# Patient Record
Sex: FEMALE | Race: WHITE | NOT HISPANIC OR LATINO | Employment: PART TIME | URBAN - METROPOLITAN AREA
[De-identification: names, ages, dates, MRNs, and addresses within clinical notes are randomized per-mention and may not be internally consistent; named-entity substitution may affect disease eponyms.]

---

## 2023-03-30 ENCOUNTER — OFFICE VISIT (OUTPATIENT)
Dept: OBGYN CLINIC | Facility: CLINIC | Age: 48
End: 2023-03-30

## 2023-03-30 VITALS
DIASTOLIC BLOOD PRESSURE: 78 MMHG | HEIGHT: 67 IN | WEIGHT: 146.4 LBS | SYSTOLIC BLOOD PRESSURE: 120 MMHG | BODY MASS INDEX: 22.98 KG/M2

## 2023-03-30 DIAGNOSIS — D21.9 FIBROID: ICD-10-CM

## 2023-03-30 DIAGNOSIS — F32.A DEPRESSION, UNSPECIFIED DEPRESSION TYPE: ICD-10-CM

## 2023-03-30 DIAGNOSIS — N63.10 MASS OF RIGHT BREAST, UNSPECIFIED QUADRANT: ICD-10-CM

## 2023-03-30 DIAGNOSIS — Z01.411 ENCOUNTER FOR GYNECOLOGICAL EXAMINATION WITH ABNORMAL FINDING: Primary | ICD-10-CM

## 2023-03-30 DIAGNOSIS — Z12.31 ENCOUNTER FOR SCREENING MAMMOGRAM FOR MALIGNANT NEOPLASM OF BREAST: ICD-10-CM

## 2023-03-30 DIAGNOSIS — Z12.4 SCREENING FOR CERVICAL CANCER: ICD-10-CM

## 2023-03-30 RX ORDER — QUINIDINE GLUCONATE 324 MG
240 TABLET, EXTENDED RELEASE ORAL
COMMUNITY

## 2023-03-30 RX ORDER — NAPROXEN 500 MG/1
TABLET ORAL AS NEEDED
COMMUNITY
Start: 2023-03-03

## 2023-03-30 RX ORDER — ALBUTEROL SULFATE 90 UG/1
2 AEROSOL, METERED RESPIRATORY (INHALATION) 3 TIMES DAILY PRN
COMMUNITY
Start: 2013-03-07

## 2023-03-30 RX ORDER — MAGNESIUM CHLORIDE 64 MG
1070 TABLET, DELAYED RELEASE (ENTERIC COATED) ORAL DAILY
COMMUNITY

## 2023-03-30 RX ORDER — MAGNESIUM OXIDE/MAG AA CHELATE 300 MG
2 CAPSULE ORAL
COMMUNITY

## 2023-03-30 NOTE — PATIENT INSTRUCTIONS
Pap every 3- 5 years if normal, sexually transmitted infection testing as indicated, exercise most days of week, obtain appropriate diet and hydration, Calcium 1000mg + 600 vit D daily, birth control as directed (ACHES reviewed)  Benefits, risks and alternatives discussed/reviewed  HPV 9 vaccine recommended through age 39  Check with your insurance for coverage  If covered, call office to schedule start of vaccine series  Annual mammogram starting at age 36, monthly breast self exam  Maryuri Vaca 20 times twice daily

## 2023-03-30 NOTE — PROGRESS NOTES
[id number]   [address], [address], [facility], [facility]    ASSESSMENT/PLAN: Sameer Mariano is a 50 y o  No obstetric history on file  who presents for annual gynecologic exam     Encounter for routine gynecologic examination  - Routine well woman exam completed today  - Cervical Cancer Screening: Current ASCCP Guidelines reviewed  Last Pap: 02/15/2019   Next Pap Due: today   - STI screening offered including HIV testing: na  dw    - Contraceptive counseling discussed  Current contraception  None :   - Breast Cancer Screening: Last Mammogram 2023, needs  Biopsy      Additional problems addressed during this visit:  1  Encounter for gynecological examination with abnormal finding    2  Encounter for screening mammogram for malignant neoplasm of breast  -     Mammo screening bilateral w 3d & cad; Future    3  Depression, unspecified depression type  Comments:  after death of  father   and currently getting  a divorce     4  Screening for cervical cancer    5  Mass of right breast, unspecified quadrant  Comments:  fu       6  Fibroid        CC:  Annual Gynecologic Examination    HPI: Sameer Mariano is a 50 y o  No obstetric history on file  who presents for annual gynecologic examination  49 yo   ( 2 living  Twins female)    Cycles every 28  D  For  5 d    + hx of PCOS    + hx of fibroid   Some    Discomfort prior to  Menses    + hx of breast  Biopsy in left benign   Pending right   bx on        The following portions of the patient's history were reviewed and updated as appropriate: She  has a past medical history of Asthma (1977), Deep vein thrombosis ([facility] [address] ) (2009), Depression (2020), Female infertility (2006), Fibroid (2006), History of screening mammography (2023), Miscarriage (2006), and Varicella (1977)  She  has a past surgical history that includes Breast biopsy (1992) and  section (2009)    Her family history includes Asthma in her brother, daughter, father, and sister; Colon cancer (age of onset: 76) in her paternal grandfather; Colon polyps in her mother; Diabetes in her father; Hypertension in her father; Pancreatic cancer (age of onset: 59) in her father; Thyroid disease in her mother  She  reports that she quit smoking about 22 years ago  Her smoking use included cigarettes  She started smoking about 28 years ago  She has a 1 25 pack-year smoking history  She has never used smokeless tobacco  She reports current alcohol use of about 4 0 standard drinks per week  She reports that she does not currently use drugs after having used the following drugs: Cocaine and Marijuana  Frequency: 1 00 time per week  Current Outpatient Medications   Medication Sig Dispense Refill   • albuterol (PROVENTIL HFA,VENTOLIN HFA) 90 mcg/act inhaler Inhale 2 puffs 3 (three) times a day as needed     • Cholecalciferol (Vitamin D3) 125 MCG (5000 UT) CAPS Take by mouth     • ferrous gluconate (Iron 27) 240 (27 FE) MG tablet Take 240 mg by mouth Daily at 2am     • Magnesium 300 MG CAPS Take 2 capsules by mouth Daily at 2am     • naproxen (NAPROSYN) 500 mg tablet if needed     • magnesium chloride (MAG64) 64 MG TBEC EC tablet Take 1,070 mg by mouth daily (Patient not taking: Reported on 3/30/2023)       No current facility-administered medications for this visit  She is allergic to epinephrine       Review of Systems   Constitutional: Negative for chills and fever  HENT: Negative for ear pain and sore throat  Eyes: Negative for pain and visual disturbance  Respiratory: Negative for cough and shortness of breath  Cardiovascular: Negative for chest pain and palpitations  Gastrointestinal: Negative for abdominal pain and vomiting  Genitourinary: Negative for dysuria and hematuria  Musculoskeletal: Negative for arthralgias and back pain  Skin: Negative for color change and rash     Neurological: Negative for "seizures and syncope  Hematological: Negative  Psychiatric/Behavioral: Negative  All other systems reviewed and are negative  Objective:  /78   Ht 5' 6 75\" (1 695 m)   Wt 66 4 kg (146 lb 6 4 oz)   LMP 03/05/2023 (Exact Date)   Breastfeeding No   BMI 23 10 kg/m²    Physical Exam  Vitals and nursing note reviewed  Constitutional:       Appearance: Normal appearance  HENT:      Head: Normocephalic  Cardiovascular:      Rate and Rhythm: Normal rate and regular rhythm  Heart sounds: Normal heart sounds  Pulmonary:      Effort: Pulmonary effort is normal       Breath sounds: Normal breath sounds  Chest:      Chest wall: No mass, lacerations, swelling, tenderness or edema  Breasts: Darrel Score is 4  Breasts are symmetrical       Right: Normal  No swelling, bleeding, inverted nipple, mass, nipple discharge, skin change or tenderness  Left: No swelling, bleeding, inverted nipple, mass, nipple discharge, skin change or tenderness  Abdominal:      General: Abdomen is flat  Bowel sounds are normal       Palpations: Abdomen is soft  Genitourinary:     General: Normal vulva  Exam position: Lithotomy position  Pubic Area: No rash  Darrel stage (genital): 4       Labia:         Right: No rash, tenderness or lesion  Left: No rash, tenderness or lesion  Urethra: No urethral pain, urethral swelling or urethral lesion  Vagina: Normal       Cervix: Normal and dilated  Uterus: Enlarged  Adnexa: Right adnexa normal and left adnexa normal       Rectum: Normal       Comments: Midline uterus    irregualr   4 cm    Musculoskeletal:         General: Normal range of motion  Cervical back: Neck supple  Lymphadenopathy:      Upper Body:      Right upper body: No supraclavicular, axillary or pectoral adenopathy  Left upper body: No supraclavicular, axillary or pectoral adenopathy  Lower Body: No right inguinal adenopathy   No " left inguinal adenopathy  Skin:     General: Skin is warm and dry  Neurological:      General: No focal deficit present  Mental Status: She is alert and oriented to person, place, and time  Psychiatric:         Mood and Affect: Mood normal          Behavior: Behavior normal          Thought Content:  Thought content normal          Judgment: Judgment normal

## 2023-04-03 LAB
CYTOLOGIST CVX/VAG CYTO: NORMAL
DX ICD CODE: NORMAL
HPV GENOTYPE REFLEX: NORMAL
HPV I/H RISK 4 DNA CVX QL PROBE+SIG AMP: NEGATIVE
OTHER STN SPEC: NORMAL
PATH REPORT.FINAL DX SPEC: NORMAL
SL AMB NOTE:: NORMAL
SL AMB SPECIMEN ADEQUACY: NORMAL
SL AMB TEST METHODOLOGY: NORMAL